# Patient Record
Sex: FEMALE | Race: OTHER | Employment: OTHER | URBAN - METROPOLITAN AREA
[De-identification: names, ages, dates, MRNs, and addresses within clinical notes are randomized per-mention and may not be internally consistent; named-entity substitution may affect disease eponyms.]

---

## 2019-05-11 ENCOUNTER — HOSPITAL ENCOUNTER (OUTPATIENT)
Dept: ULTRASOUND IMAGING | Age: 29
Discharge: HOME | End: 2019-05-11
Attending: OBSTETRICS & GYNECOLOGY
Payer: SELF-PAY

## 2019-05-11 DIAGNOSIS — R10.2 PELVIC PAIN: ICD-10-CM

## 2019-05-11 PROCEDURE — 76830 TRANSVAGINAL US NON-OB: CPT

## 2019-10-01 ENCOUNTER — HOSPITAL ENCOUNTER (EMERGENCY)
Age: 29
Discharge: HOME | End: 2019-10-01
Attending: EMERGENCY MEDICINE
Payer: SELF-PAY

## 2019-10-01 VITALS
RESPIRATION RATE: 18 BRPM | WEIGHT: 148 LBS | BODY MASS INDEX: 26.22 KG/M2 | DIASTOLIC BLOOD PRESSURE: 79 MMHG | TEMPERATURE: 98 F | SYSTOLIC BLOOD PRESSURE: 128 MMHG | HEIGHT: 63 IN | OXYGEN SATURATION: 100 % | HEART RATE: 74 BPM

## 2019-10-01 DIAGNOSIS — R19.7 DIARRHEA, UNSPECIFIED TYPE: Primary | ICD-10-CM

## 2019-10-01 LAB
ALBUMIN SERPL-MCNC: 3.7 G/DL (ref 3.4–5)
ALBUMIN/GLOB SERPL: 1 {RATIO} (ref 0.8–1.7)
ALP SERPL-CCNC: 56 U/L (ref 45–117)
ALT SERPL-CCNC: 25 U/L (ref 13–56)
ANION GAP SERPL CALC-SCNC: 5 MMOL/L (ref 3–18)
APPEARANCE UR: CLEAR
AST SERPL-CCNC: 22 U/L (ref 10–38)
BASOPHILS # BLD: 0 K/UL (ref 0–0.1)
BASOPHILS NFR BLD: 0 % (ref 0–2)
BILIRUB SERPL-MCNC: 0.4 MG/DL (ref 0.2–1)
BILIRUB UR QL: NEGATIVE
BUN SERPL-MCNC: 11 MG/DL (ref 7–18)
BUN/CREAT SERPL: 17 (ref 12–20)
CALCIUM SERPL-MCNC: 8.4 MG/DL (ref 8.5–10.1)
CHLORIDE SERPL-SCNC: 105 MMOL/L (ref 100–111)
CO2 SERPL-SCNC: 26 MMOL/L (ref 21–32)
COLOR UR: YELLOW
CREAT SERPL-MCNC: 0.65 MG/DL (ref 0.6–1.3)
DIFFERENTIAL METHOD BLD: ABNORMAL
EOSINOPHIL # BLD: 0.2 K/UL (ref 0–0.4)
EOSINOPHIL NFR BLD: 3 % (ref 0–5)
ERYTHROCYTE [DISTWIDTH] IN BLOOD BY AUTOMATED COUNT: 12.8 % (ref 11.6–14.5)
GLOBULIN SER CALC-MCNC: 3.7 G/DL (ref 2–4)
GLUCOSE SERPL-MCNC: 80 MG/DL (ref 74–99)
GLUCOSE UR STRIP.AUTO-MCNC: NEGATIVE MG/DL
HCG UR QL: NEGATIVE
HCT VFR BLD AUTO: 39.7 % (ref 35–45)
HGB BLD-MCNC: 12.7 G/DL (ref 12–16)
HGB UR QL STRIP: NEGATIVE
KETONES UR QL STRIP.AUTO: NEGATIVE MG/DL
LEUKOCYTE ESTERASE UR QL STRIP.AUTO: NEGATIVE
LIPASE SERPL-CCNC: 76 U/L (ref 73–393)
LYMPHOCYTES # BLD: 2.6 K/UL (ref 0.9–3.6)
LYMPHOCYTES NFR BLD: 36 % (ref 21–52)
MCH RBC QN AUTO: 27.4 PG (ref 24–34)
MCHC RBC AUTO-ENTMCNC: 32 G/DL (ref 31–37)
MCV RBC AUTO: 85.6 FL (ref 74–97)
MONOCYTES # BLD: 0.4 K/UL (ref 0.05–1.2)
MONOCYTES NFR BLD: 6 % (ref 3–10)
NEUTS SEG # BLD: 3.8 K/UL (ref 1.8–8)
NEUTS SEG NFR BLD: 55 % (ref 40–73)
NITRITE UR QL STRIP.AUTO: NEGATIVE
PH UR STRIP: 6 [PH] (ref 5–8)
PLATELET # BLD AUTO: 209 K/UL (ref 135–420)
PMV BLD AUTO: 12.1 FL (ref 9.2–11.8)
POTASSIUM SERPL-SCNC: 4 MMOL/L (ref 3.5–5.5)
PROT SERPL-MCNC: 7.4 G/DL (ref 6.4–8.2)
PROT UR STRIP-MCNC: NEGATIVE MG/DL
RBC # BLD AUTO: 4.64 M/UL (ref 4.2–5.3)
SODIUM SERPL-SCNC: 136 MMOL/L (ref 136–145)
SP GR UR REFRACTOMETRY: 1.01 (ref 1–1.03)
UROBILINOGEN UR QL STRIP.AUTO: 0.2 EU/DL (ref 0.2–1)
WBC # BLD AUTO: 7 K/UL (ref 4.6–13.2)

## 2019-10-01 PROCEDURE — 81003 URINALYSIS AUTO W/O SCOPE: CPT

## 2019-10-01 PROCEDURE — 80053 COMPREHEN METABOLIC PANEL: CPT

## 2019-10-01 PROCEDURE — 74011250636 HC RX REV CODE- 250/636: Performed by: PHYSICIAN ASSISTANT

## 2019-10-01 PROCEDURE — 99282 EMERGENCY DEPT VISIT SF MDM: CPT

## 2019-10-01 PROCEDURE — 85025 COMPLETE CBC W/AUTO DIFF WBC: CPT

## 2019-10-01 PROCEDURE — 81025 URINE PREGNANCY TEST: CPT

## 2019-10-01 PROCEDURE — 96374 THER/PROPH/DIAG INJ IV PUSH: CPT

## 2019-10-01 PROCEDURE — 83690 ASSAY OF LIPASE: CPT

## 2019-10-01 RX ORDER — ONDANSETRON 2 MG/ML
4 INJECTION INTRAMUSCULAR; INTRAVENOUS
Status: COMPLETED | OUTPATIENT
Start: 2019-10-01 | End: 2019-10-01

## 2019-10-01 RX ADMIN — ONDANSETRON 4 MG: 2 INJECTION INTRAMUSCULAR; INTRAVENOUS at 15:31

## 2019-10-01 RX ADMIN — SODIUM CHLORIDE 1000 ML: 900 INJECTION, SOLUTION INTRAVENOUS at 15:31

## 2019-10-01 NOTE — ED NOTES
Patient discharged to home with RX instructions,  all questions answered, patient voices understanding, patient instructed on follow up, VSS, patient in no distress at this time, patient ambulated home with out difficulty

## 2019-10-01 NOTE — ED PROVIDER NOTES
HPI     Jeimy Fry is a 30 y.o. female with hx of appendectomy ambulatory to ED c/o generalized abdominal pain and cramping intermittently x2 weeks, with loose stools > 2 weeks. Patient had 3 episodes today, no diarrhea yesterday, on average has 04 loose BMs a day. She was seen by [facility] today and was referred to ED for evaluation. Denies dysuria. Denies fever, chills, change in appetite, chest pain, palpitations, dry mouth, N/V, rectal bleeding, hematuria, back pain, flank pain, joint pain, skin changes, vaginal complaints. Patient doubts pregnancy. Denies weight loss. Denies recent use of antibiotics. Denies recent travel. Patient notes that she has no PCP due to lack of insurance. History reviewed. No pertinent past medical history. Confirmed with the pt    Past Surgical History:   Procedure Laterality Date    HX APPENDECTOMY           History reviewed. No pertinent family history.     Social History     Socioeconomic History    Marital status:      Spouse name: Not on file    Number of children: Not on file    Years of education: Not on file    Highest education level: Not on file   Occupational History    Not on file   Social Needs    Financial resource strain: Not on file    Food insecurity:     Worry: Not on file     Inability: Not on file    Transportation needs:     Medical: Not on file     Non-medical: Not on file   Tobacco Use    Smoking status: Never Smoker    Smokeless tobacco: Never Used   Substance and Sexual Activity    Alcohol use: Yes     Comment: weekends    Drug use: Never    Sexual activity: Not on file   Lifestyle    Physical activity:     Days per week: Not on file     Minutes per session: Not on file    Stress: Not on file   Relationships    Social connections:     Talks on phone: Not on file     Gets together: Not on file     Attends Islam service: Not on file     Active member of club or organization: Not on file     Attends meetings of clubs or organizations: Not on file     Relationship status: Not on file    Intimate partner violence:     Fear of current or ex partner: Not on file     Emotionally abused: Not on file     Physically abused: Not on file     Forced sexual activity: Not on file   Other Topics Concern    Not on file   Social History Narrative    Not on file         ALLERGIES: Patient has no known allergies. Review of Systems   Constitutional: Negative for activity change, appetite change, chills and fever. HENT:        Denies dry mouth. Eyes: Negative. Respiratory: Negative for chest tightness and shortness of breath. Cardiovascular: Negative for chest pain and palpitations. Gastrointestinal: Positive for diarrhea and nausea (sometimes, no vomiting). Negative for abdominal pain, constipation and vomiting. Genitourinary: Negative for difficulty urinating, flank pain, frequency, hematuria, urgency, vaginal bleeding and vaginal discharge. Musculoskeletal: Negative. Skin: Negative for color change, pallor and rash. Neurological: Negative for dizziness, syncope, weakness, light-headedness and headaches. Hematological: Negative for adenopathy. Does not bruise/bleed easily. Psychiatric/Behavioral: Negative for agitation. The patient is not nervous/anxious. All other systems reviewed and are negative. Vitals:    10/01/19 1427   BP: 131/88   Pulse: 76   Resp: 18   Temp: 98.1 °F (36.7 °C)   SpO2: 100%   Weight: 67.1 kg (148 lb)   Height: 5' 3\" (1.6 m)            Physical Exam   Constitutional: She is oriented to person, place, and time. She appears well-developed and well-nourished. Non-toxic appearance. No distress. HENT:   Head: Normocephalic and atraumatic. Nose: Nose normal.   Mouth/Throat: Uvula is midline, oropharynx is clear and moist and mucous membranes are normal.   Neck: Normal range of motion. Neck supple.    Cardiovascular: Normal rate, regular rhythm, normal heart sounds and intact distal pulses. Pulmonary/Chest: Effort normal and breath sounds normal.   Abdominal: Soft. Bowel sounds are normal. There is no hepatosplenomegaly. There is generalized tenderness (very mild). There is no rigidity, no rebound, no guarding, no CVA tenderness, no tenderness at McBurney's point and negative Du's sign. No hernia. Hernia confirmed negative in the right inguinal area and confirmed negative in the left inguinal area. Musculoskeletal: Normal range of motion. Lymphadenopathy:     She has no cervical adenopathy. Neurological: She is alert and oriented to person, place, and time. She has normal reflexes. Skin: Skin is warm and dry. Capillary refill takes less than 2 seconds. She is not diaphoretic. No pallor. No jaundice   Psychiatric: She has a normal mood and affect. Nursing note and vitals reviewed. MDM  Number of Diagnoses or Management Options  Diarrhea, unspecified type:   Diagnosis management comments: DDx: gastroenteritis, GERD, hernia, hepatitis, pancreatitis, gallbladder etiology, constipation, UTI, pyelo, kidney stones,   preg,  diverticulitis, SBO, GI bleed  Much less likely GYN etiology as pt has no pelvic pain, dysuria or vag complaints  Pt had appendectomy    IMPRESSION/PLAN:  3:13 PM   Well-appearing pt with persistent diarrhea x2-3 weeks. Denies any signs of dehydration or anemia. Denies blood in stool. VSS. Exam is benign, soft minimally tender abdomen in all fields, without peritoneal signs. Will check labs to rule out dehydration, electrolyte imbalance. Will culture stool if possible. Will address findings. Will refer to GI. NALINI Sky     PROGRESS NOTES:  4:40 PM   Gema hager is working with the pt to ensure f/u. Referred her to [facility] clinic, pt has info brochure. NALINI Sky     Consult:    4:40 PM   Discussed care with Alon Cordova MD ED attending.   Standard discussion; including history of patients chief complaint, available diagnostic results, and treatment course. PLAN: agrees that this pt is safe for dc, no diarrhea in ED, pt is well-appearing, no peritoneal signs, abd pain elicited is very minimal (more like discomfort), pt has stable VS and no signs of dehydration, anemia, inflammation or other findings on labs. Pt will establish primary care and see GI. Jose Ureña PA-C      PROGRESS NOTES:  4:42 PM   Discussed results, care in ED and further care, f/u and s/s warranting return to ED. Pt understood and agreed to plan. NALINI Eldridge        Amount and/or Complexity of Data Reviewed  Clinical lab tests: ordered and reviewed  Discuss the patient with other providers: yes    Risk of Complications, Morbidity, and/or Mortality  Presenting problems: moderate  Diagnostic procedures: moderate  Management options: moderate    Patient Progress  Patient progress: stable         Procedures          Medications ordered:   Medications   sodium chloride 0.9 % bolus infusion 1,000 mL (1,000 mL IntraVENous New Bag 10/1/19 1531)   ondansetron (ZOFRAN) injection 4 mg (4 mg IntraVENous Given 10/1/19 1531)        Lab findings:   Labs Reviewed   CBC WITH AUTOMATED DIFF - Abnormal; Notable for the following components:       Result Value    MPV 12.1 (*)     All other components within normal limits   METABOLIC PANEL, COMPREHENSIVE - Abnormal; Notable for the following components:    Calcium 8.4 (*)     All other components within normal limits   LIPASE   URINALYSIS W/ RFLX MICROSCOPIC   HCG URINE, QL        EKG interpretation:   EKG Results     None           X-Ray, CT or other radiology findings or impressions:   No orders to display               Diagnosis:   1.  Diarrhea, unspecified type          Disposition: home    Follow-up Information     Follow up With Specialties Details Why Carlin Flores MD Gastroenterology, Internal Medicine Schedule an appointment as soon as possible for a visit for further evaluation and treatment by GASTROENTEROLOGIST (osorio gupta) [address] [phone]      [facility] EMERGENCY DEPT Emergency Medicine  As needed, If symptoms worsen [address]  [phone]    [facility] [address]    will help to establish care [address] [facility] [address] [place] [address]  [phone]    [facility]   for primary care [address] [place] [address]  [phone]         consult placed. Patient's Medications    No medications on file        Dictation disclaimer:  Please note that this dictation was completed with [employer], the computer voice recognition software. Quite often unanticipated grammatical, syntax, homophones, and other interpretive errors are inadvertently transcribed by the computer software. Please disregard these errors. Please excuse any errors that have escaped final proofreading.

## 2019-10-01 NOTE — ED TRIAGE NOTES
Abdominal pain and cramping x 2 weeks with loose stools greater than 2 weeks. Sent by [facility] to be evaluated. States doesn't think she is pregnant. No difficulty passing urine but has felt a tiny discomfort sometimes. 6/10. C./o chills no fever. Denies weight loss.

## 2019-10-01 NOTE — PROGRESS NOTES
Referral received to assist with GI follow up. Pt is uninsured. Met pt at bedside. Explained to pt she has a copay of $200 to see GI. She's asking about payment plan. Called [facility] and was told copay is $175 and no payment plan. Pt states she has a big procedure coming up and can't afford extra expenses right now. Pt given information on [place] [facility]. Pt advised to call them and ask information about the program. She states she will. ED PA made aware.